# Patient Record
Sex: FEMALE | Race: BLACK OR AFRICAN AMERICAN | Employment: PART TIME | URBAN - METROPOLITAN AREA
[De-identification: names, ages, dates, MRNs, and addresses within clinical notes are randomized per-mention and may not be internally consistent; named-entity substitution may affect disease eponyms.]

---

## 2019-09-12 ENCOUNTER — OFFICE VISIT (OUTPATIENT)
Dept: FAMILY MEDICINE CLINIC | Age: 29
End: 2019-09-12

## 2019-09-12 VITALS
TEMPERATURE: 96.5 F | WEIGHT: 170 LBS | BODY MASS INDEX: 31.28 KG/M2 | HEART RATE: 75 BPM | OXYGEN SATURATION: 98 % | RESPIRATION RATE: 18 BRPM | SYSTOLIC BLOOD PRESSURE: 146 MMHG | DIASTOLIC BLOOD PRESSURE: 107 MMHG | HEIGHT: 62 IN

## 2019-09-12 DIAGNOSIS — J30.9 ALLERGIC RHINITIS, UNSPECIFIED SEASONALITY, UNSPECIFIED TRIGGER: Primary | ICD-10-CM

## 2019-09-12 DIAGNOSIS — F60.3 BORDERLINE PERSONALITY DISORDER (HCC): ICD-10-CM

## 2019-09-12 DIAGNOSIS — Z76.89 ENCOUNTER TO ESTABLISH CARE: ICD-10-CM

## 2019-09-12 DIAGNOSIS — Z79.899 HIGH RISK MEDICATION USE: ICD-10-CM

## 2019-09-12 DIAGNOSIS — F41.9 ANXIETY: ICD-10-CM

## 2019-09-12 DIAGNOSIS — H61.23 BILATERAL IMPACTED CERUMEN: ICD-10-CM

## 2019-09-12 DIAGNOSIS — Z13.220 LIPID SCREENING: ICD-10-CM

## 2019-09-12 DIAGNOSIS — Z13.29 SCREENING FOR THYROID DISORDER: ICD-10-CM

## 2019-09-12 RX ORDER — TRAZODONE HYDROCHLORIDE 100 MG/1
100 TABLET ORAL
COMMUNITY

## 2019-09-12 RX ORDER — MOMETASONE FUROATE 50 UG/1
2 SPRAY, METERED NASAL DAILY
Qty: 1 CONTAINER | Refills: 2 | Status: SHIPPED | OUTPATIENT
Start: 2019-09-12 | End: 2019-10-24 | Stop reason: SDUPTHER

## 2019-09-12 RX ORDER — BUSPIRONE HYDROCHLORIDE 10 MG/1
20 TABLET ORAL 2 TIMES DAILY
COMMUNITY

## 2019-09-12 RX ORDER — CETIRIZINE HCL 10 MG
10 TABLET ORAL DAILY
Qty: 90 TAB | Refills: 0 | Status: SHIPPED | OUTPATIENT
Start: 2019-09-12 | End: 2019-10-24 | Stop reason: SDUPTHER

## 2019-09-12 RX ORDER — OLANZAPINE 5 MG/1
TABLET ORAL
COMMUNITY

## 2019-09-12 NOTE — PROGRESS NOTES
ESTABLISH CARE VISIT    SUBJECTIVE:     Chief Complaint   Patient presents with    Establish Care    Nasal Congestion     Pt states that she has problems breathing out of her nose       HPI: 30 y.o.  female  has a past medical history of ADHD, Anxiety, and Borderline personality disorder ([facility] [address] is here for the above chief complaint(s). Borderline Personality Disorder/ADHD/Anxiety  Patient states that she was recently in rehab for substance abuse and has a follow-up with [facility] at the end of September. Patient is taking  Key Psychotherapeutic Meds             traZODone (DESYREL) 100 mg tablet (Taking) Take 100 mg by mouth nightly. 1/2 to 1 tab at bedtime. busPIRone (BUSPAR) 10 mg tablet (Taking) Take 20 mg by mouth two (2) times a day. OLANZapine (ZYPREXA) 5 mg tablet (Taking) Take  by mouth nightly. Patient states that she is taking her medications regularly. She doesn't feel the anxiety is controlled. She was feeling irritable yesterday. Allergic rhinitis  Patient states that she has ongoing allergies. She reports chronic sinus congestion and difficulty with breathing through her nose as well as runny nose. Patient states that she takes flonase, but she is using this more frequently than once a day. She was also taking claritin and did find this to be helpful, but she ran out and was not able to afford this OTC. She denies any fevers/chills, sore throat, sinus pressure, headaches, ear pain. Review of Systems   Constitutional: Negative for chills, fever, malaise/fatigue and weight loss. Eyes: Negative for blurred vision, double vision and pain. Respiratory: Negative for cough, sputum production, shortness of breath and wheezing. Cardiovascular: Negative for chest pain, palpitations, orthopnea, claudication and leg swelling. Gastrointestinal: Negative for abdominal pain, constipation, diarrhea, nausea and vomiting.    Genitourinary: Negative for dysuria, frequency and urgency. Neurological: Negative for dizziness, tingling and headaches. [unfilled]  Current Outpatient Medications   Medication Sig    traZODone (DESYREL) 100 mg tablet Take 100 mg by mouth nightly. 1/2 to 1 tab at bedtime.  busPIRone (BUSPAR) 10 mg tablet Take 20 mg by mouth two (2) times a day.  OLANZapine (ZYPREXA) 5 mg tablet Take  by mouth nightly.  fluticasone (FLONASE) 50 mcg/actuation nasal spray 2 Sprays by Both Nostrils route daily.  triamcinolone acetonide (KENALOG) 0.5 % ointment Apply  to affected area two (2) times a day. use thin layer    olopatadine (PATANOL) 0.1 % ophthalmic solution Administer 2 Drops to both eyes two (2) times a day. No current facility-administered medications for this visit. Health Maintenance   Topic Date Due    DTaP/Tdap/Td series (1 - Tdap) 02/05/2009    PAP AKA CERVICAL CYTOLOGY  02/05/2009    Influenza Age 5 to Adult  08/01/2019    Pneumococcal 0-64 years  Aged Out       Medications and Allergies: Reviewed and confirmed in the chart    Past Medical Hx: Reviewed and confirmed in the chart  Past Medical History:   Diagnosis Date    ADHD     Anxiety     Borderline personality disorder ([facility] [address]        There is no problem list on file for this patient. Family Hx, Surgical Hx, Social Hx: Reviewed and updated in EMR    OBJECTIVE:  Vitals:    09/12/19 1035   BP: (!) 146/107   Pulse: 75   Resp: 18   Temp: 96.5 °F (35.8 °C)   TempSrc: Oral   SpO2: 98%   Weight: 170 lb (77.1 kg)   Height: 5' 2\" (1.575 m)       BP Readings from Last 3 Encounters:   09/12/19 (!) 146/107   04/09/13 132/75   07/06/12 116/70     Wt Readings from Last 3 Encounters:   09/12/19 170 lb (77.1 kg)   04/09/13 168 lb 9.6 oz (76.5 kg)   07/06/12 157 lb 9.6 oz (71.5 kg)       Physical Exam   Constitutional: She is oriented to person, place, and time and well-developed, well-nourished, and in no distress. No distress.    HENT:   Right Ear: Hearing and external ear normal. No lacerations. A foreign body is present. No mastoid tenderness. No hemotympanum. Left Ear: External ear normal. No lacerations. A foreign body is present. No mastoid tenderness. No hemotympanum. No decreased hearing is noted. Nose: Mucosal edema and rhinorrhea present. No nose lacerations, sinus tenderness, nasal deformity, septal deviation or nasal septal hematoma. No epistaxis. No foreign bodies. Mouth/Throat: Uvula is midline and oropharynx is clear and moist.   Bilateral cerumen impaction  ++boggy turbinates, +edema   Neck: Normal range of motion. Neck supple. No thyromegaly present. Cardiovascular: Normal rate, regular rhythm, normal heart sounds and intact distal pulses. Exam reveals no gallop and no friction rub. No murmur heard. Pulmonary/Chest: Effort normal and breath sounds normal. No respiratory distress. She has no wheezes. She has no rales. She exhibits no tenderness. Lymphadenopathy:     She has no cervical adenopathy. Neurological: She is alert and oriented to person, place, and time. Skin: Skin is warm and dry. She is not diaphoretic. Psychiatric: Mood, memory, affect and judgment normal.   Vitals reviewed. Nursing Notes Reviewed    ASSESSMENT AND PLAN  Diagnoses and all orders for this visit:    1. Allergic rhinitis, unspecified seasonality, unspecified trigger  -     mometasone (NASONEX) 50 mcg/actuation nasal spray; 2 Sprays by Both Nostrils route daily. -     cetirizine (ZYRTEC) 10 mg tablet; Take 1 Tab by mouth daily. 2. High risk medication use  -     AMB POC EKG ROUTINE W/ 12 LEADS, INTER & REP    3. Bilateral impacted cerumen  -     carbamide peroxide (DEBROX) 6.5 % otic solution; Administer 5 Drops into each ear two (2) times a day. 4. Borderline personality disorder ([facility] [address]  Continue with   Key Psychotherapeutic Meds             traZODone (DESYREL) 100 mg tablet (Taking) Take 100 mg by mouth nightly. 1/2 to 1 tab at bedtime.     busPIRone (BUSPAR) 10 mg tablet (Taking) Take 20 mg by mouth two (2) times a day. OLANZapine (ZYPREXA) 5 mg tablet (Taking) Take  by mouth nightly. As prescribed. Can switch zyprexa to the am on a day she isn't working to see if she notices improvement in her mood and to see how sleep it will make her. Follow-up with psychiatry as schedule at the end of the month. 5. Anxiety  As above      Orders Placed This Encounter    traZODone (DESYREL) 100 mg tablet    busPIRone (BUSPAR) 10 mg tablet    OLANZapine (ZYPREXA) 5 mg tablet         I have discussed the diagnosis with the patient and the intended plan as seen in the above orders. The patient has received an after-visit summary and questions were answered concerning future plans. I have discussed medication side effects and warnings with the patient as well. I have reviewed the plan of care with the patient, accepted their input and they are in agreement with the treatment goals. More than 50% of this 30 min visit was spent counseling the patient face to face about etiology and treatment of health conditions outlined in assessment and plan        Cheyenne Chase [address], [address] [place], [place] [address] [phone]  [phone]

## 2019-09-12 NOTE — PROGRESS NOTES
Chief Complaint   Patient presents with   [address] [place] [address] [facility]    Nasal Congestion     Pt states that she has problems breathing out of her nose     1. Have you been to the ER, urgent care clinic since your last visit? Hospitalized since your last visit? No    2. Have you seen or consulted any other health care providers outside of the [address] [place] [address] since your last visit? Include any pap smears or colon screening.  [facility] [place]

## 2019-09-13 DIAGNOSIS — Z13.220 LIPID SCREENING: ICD-10-CM

## 2019-09-13 DIAGNOSIS — Z76.89 ENCOUNTER TO ESTABLISH CARE: ICD-10-CM

## 2019-09-13 DIAGNOSIS — Z13.29 SCREENING FOR THYROID DISORDER: ICD-10-CM

## 2019-10-24 ENCOUNTER — OFFICE VISIT (OUTPATIENT)
Dept: FAMILY MEDICINE CLINIC | Age: 29
End: 2019-10-24

## 2019-10-24 VITALS
DIASTOLIC BLOOD PRESSURE: 78 MMHG | TEMPERATURE: 96.8 F | SYSTOLIC BLOOD PRESSURE: 120 MMHG | HEIGHT: 62 IN | WEIGHT: 169 LBS | RESPIRATION RATE: 18 BRPM | OXYGEN SATURATION: 97 % | BODY MASS INDEX: 31.1 KG/M2 | HEART RATE: 82 BPM

## 2019-10-24 DIAGNOSIS — R82.90 CLOUDY URINE: ICD-10-CM

## 2019-10-24 DIAGNOSIS — Z01.419 WELL WOMAN EXAM: Primary | ICD-10-CM

## 2019-10-24 DIAGNOSIS — J30.9 ALLERGIC RHINITIS, UNSPECIFIED SEASONALITY, UNSPECIFIED TRIGGER: ICD-10-CM

## 2019-10-24 RX ORDER — CETIRIZINE HCL 10 MG
10 TABLET ORAL DAILY
Qty: 90 TAB | Refills: 0 | Status: SHIPPED | OUTPATIENT
Start: 2019-10-24

## 2019-10-24 RX ORDER — MOMETASONE FUROATE 50 UG/1
2 SPRAY, METERED NASAL DAILY
Qty: 1 CONTAINER | Refills: 2 | Status: SHIPPED | OUTPATIENT
Start: 2019-10-24

## 2019-10-24 NOTE — PROGRESS NOTES
SUBJECTIVE:   30 y.o. female for annual routine checkup. HPI:    Problem list updated as part of today's visit. Past medical, surgical, family history reviewed. Urine cloudy  Patient states that for the last week she is noticed that her urine has been slightly cloudy. She denies any other urinary symptoms. She denies any urinary frequency, dysuria, hematuria, fever/chills, back pain, flank pain. Patient denies any vaginal discharge, vaginal odor, abnormal uterine bleeding or rash. Right breast pain  Patient states that over the last few months she has been noticing pain in her right breast.  She states that the pain is intermittent and aching in nature. She denies any breast masses, lumps, nipple discharge. Patient does not know if these this pain is associated with her menstrual cycle. \    Social History     Tobacco Use    Smoking status: Current Some Day Smoker    Smokeless tobacco: Never Used    Tobacco comment: cigar   Substance Use Topics    Alcohol use: No    Drug use: Not Currently     Types: Marijuana, Cocaine       Current Outpatient Medications   Medication Sig Dispense Refill    cetirizine (ZYRTEC) 10 mg tablet Take 1 Tab by mouth daily. 90 Tab 0    mometasone (NASONEX) 50 mcg/actuation nasal spray 2 Sprays by Both Nostrils route daily. 1 Container 2    traZODone (DESYREL) 100 mg tablet Take 100 mg by mouth nightly. 1/2 to 1 tab at bedtime.  busPIRone (BUSPAR) 10 mg tablet Take 20 mg by mouth two (2) times a day.  OLANZapine (ZYPREXA) 5 mg tablet Take  by mouth nightly.  carbamide peroxide (DEBROX) 6.5 % otic solution Administer 5 Drops into each ear two (2) times a day. 7.5 mL 0    triamcinolone acetonide (KENALOG) 0.5 % ointment Apply  to affected area two (2) times a day. use thin layer 30 g 5    olopatadine (PATANOL) 0.1 % ophthalmic solution Administer 2 Drops to both eyes two (2) times a day. 5 mL 3     Allergies: Patient has no known allergies.    Patient's last menstrual period was 10/08/2019 (approximate). ROS:  Feeling well. No dyspnea or chest pain on exertion. No abdominal pain, change in bowel habits, black or bloody stools. No urinary tract symptoms. GYN ROS: normal menses, no abnormal bleeding, pelvic pain or discharge, no breast pain or new or enlarging lumps on self exam. No neurological complaints. OBJECTIVE:   Visit Vitals  /78   Pulse 82   Temp 96.8 °F (36 °C) (Oral)   Resp 18   Ht 5' 2\" (1.575 m)   Wt 169 lb (76.7 kg)   LMP 10/08/2019 (Approximate)   SpO2 97%   BMI 30.91 kg/m²    Body mass index is 30.91 kg/m². Gen: The patient appears well, alert, oriented, in no distress. Pulm: Lungs are clear, good air entry, no wheezes, rhonchi or rales. CV: S1 and S2 normal, no murmurs, regular rate and rhythm. Extremities show no edema, normal peripheral pulses. Pelvic exam: VULVA: normal appearing vulva with no masses, tenderness or lesions, VAGINA: normal appearing vagina with normal color and discharge, no lesions, CERVIX: normal appearing cervix without discharge or lesions, cervical discharge present - white and thin, UTERUS: uterus is normal size, shape, consistency and nontender, ADNEXA: normal adnexa in size, nontender and no masses, RECTAL: normal rectal, no masses. BREAST EXAM: breasts appear normal, no suspicious masses, no skin or nipple changes or axillary nodes    ASSESSMENT/PLAN  Diagnoses and all orders for this visit:    1. Well woman exam  -     PAP IG, APTIMA HPV AND RFX 16/18,45 ([id number]); Future  -     NUSWAB VAGINITIS PLUS; Future    2. Allergic rhinitis, unspecified seasonality, unspecified trigger  -     cetirizine (ZYRTEC) 10 mg tablet; Take 1 Tab by mouth daily. -     mometasone (NASONEX) 50 mcg/actuation nasal spray; 2 Sprays by Both Nostrils route daily. 3. Cloudy urine  -     URINALYSIS W/ RFLX MICROSCOPIC;  Future    return annually or prn     Orders Placed This Encounter   [address] PLUS     Standing Status:   Future     Standing Expiration Date:   10/24/2020    URINALYSIS W/ RFLX MICROSCOPIC     Standing Status:   Future     Standing Expiration Date:   10/24/2020    cetirizine (ZYRTEC) 10 mg tablet     Sig: Take 1 Tab by mouth daily. Dispense:  90 Tab     Refill:  0    mometasone (NASONEX) 50 mcg/actuation nasal spray     Si Sprays by Both Nostrils route daily. Dispense:  1 Container     Refill:  2    PAP IG, APTIMA HPV AND RFX [id number] ([id number])     Standing Status:   Future     Standing Expiration Date:   10/24/2020     Order Specific Question:   Pap Source? Answer:   Cervical     Order Specific Question:   Total Hysterectomy? Answer:   No     Order Specific Question:   Supracervical Hysterectomy? Answer:   No     Order Specific Question:   Post Menopausal?     Answer:   No     Order Specific Question:   Hormone Therapy? Answer:   No     Order Specific Question:   IUD? Answer:   No     Order Specific Question:   Abnormal Bleeding? Answer:   No     Order Specific Question:   Pregnant     Answer:   No     Order Specific Question:   Post Partum? Answer:    No

## 2019-10-24 NOTE — PROGRESS NOTES
Chief Complaint   Patient presents with    Well Woman     1. Have you been to the ER, urgent care clinic since your last visit? Hospitalized since your last visit? No    2. Have you seen or consulted any other health care providers outside of the [address] [place] [address] since your last visit? Include any pap smears or colon screening.  No

## 2019-10-27 LAB
A VAGINAE DNA VAG QL NAA+PROBE: ABNORMAL SCORE
BVAB2 DNA VAG QL NAA+PROBE: ABNORMAL SCORE
C ALBICANS DNA VAG QL NAA+PROBE: NEGATIVE
C GLABRATA DNA VAG QL NAA+PROBE: NEGATIVE
C TRACH RRNA SPEC QL NAA+PROBE: NEGATIVE
MEGA1 DNA VAG QL NAA+PROBE: ABNORMAL SCORE
N GONORRHOEA RRNA SPEC QL NAA+PROBE: NEGATIVE
T VAGINALIS RRNA SPEC QL NAA+PROBE: POSITIVE

## 2019-10-29 ENCOUNTER — TELEPHONE (OUTPATIENT)
Dept: FAMILY MEDICINE CLINIC | Age: 29
End: 2019-10-29

## 2019-10-29 NOTE — TELEPHONE ENCOUNTER
Please let patient know that her results of her pelvic exam showed yeast and it was indeterminate for bacterial vaginosis. I am going to treat her for both. I will call in flagyl for BV and diflucan for yeast. I would advise her to take the flagyl medication first and then take the diflucan once she has completed flagyl. If she is amenable, I will call these 2 products in. Thank you. Cheyenne Orozco PA-C  [facility]. [phone]  [address]

## 2019-10-29 NOTE — TELEPHONE ENCOUNTER
Tried to contact patient at the number provided in chart, someone picked up the phone but would not say anything. Unable to leave a message. Will try back.

## 2019-10-31 ENCOUNTER — TELEPHONE (OUTPATIENT)
Dept: FAMILY MEDICINE CLINIC | Age: 29
End: 2019-10-31

## 2019-10-31 DIAGNOSIS — B37.31 VAGINAL YEAST INFECTION: ICD-10-CM

## 2019-10-31 DIAGNOSIS — B96.89 BACTERIAL VAGINOSIS: Primary | ICD-10-CM

## 2019-10-31 DIAGNOSIS — N76.0 BACTERIAL VAGINOSIS: Primary | ICD-10-CM

## 2019-10-31 NOTE — TELEPHONE ENCOUNTER
Pt returned call from provider. She is unable to answer her phone while working & will try to call back before 2:30 today.

## 2019-10-31 NOTE — TELEPHONE ENCOUNTER
Pt called back to speak with provider. Provider was not available and pt was told, she then requested her lab results be left on her voice mail today.

## 2019-11-01 LAB
CYTOLOGIST CVX/VAG CYTO: ABNORMAL
CYTOLOGY CVX/VAG DOC CYTO: ABNORMAL
CYTOLOGY CVX/VAG DOC THIN PREP: ABNORMAL
HPV GENOTYPE 18,45: NEGATIVE
HPV I/H RISK 4 DNA CVX QL PROBE+SIG AMP: POSITIVE
HPV16 DNA CVX QL PROBE+SIG AMP: NEGATIVE
Lab: ABNORMAL
OTHER STN SPEC: ABNORMAL
STAT OF ADQ CVX/VAG CYTO-IMP: ABNORMAL

## 2019-11-01 RX ORDER — FLUCONAZOLE 150 MG/1
TABLET ORAL
Qty: 3 TAB | Refills: 0 | Status: SHIPPED | OUTPATIENT
Start: 2019-11-01

## 2019-11-01 RX ORDER — METRONIDAZOLE 500 MG/1
500 TABLET ORAL 2 TIMES DAILY
Qty: 14 TAB | Refills: 0 | Status: SHIPPED | OUTPATIENT
Start: 2019-11-01 | End: 2019-11-08

## 2019-11-01 NOTE — TELEPHONE ENCOUNTER
Please call patient back with her results of her pelvic exam as described below:     Please let patient know that her results of her pelvic exam showed yeast and it was indeterminate for bacterial vaginosis. I am going to treat her for both. I will call in flagyl for BV and diflucan for yeast. I would advise her to take the flagyl medication first and then take the diflucan once she has completed flagyl. If she is amenable, I will call these 2 products in.  Thank you.

## 2019-11-01 NOTE — TELEPHONE ENCOUNTER
Patient was informed of her nuswab results but was wondering about her pap results. Patient states that we can leave the pap results on her voice mail. Patient would like the flagyl and diflucan sent to her pharmacy. Routed to provider for review.

## 2019-11-02 NOTE — TELEPHONE ENCOUNTER
meds sent to pharmacy. Will call patient with PAP results on Monday when scheduled to work again. Cheyenne Orozco PA-C  [facility]  [phone]  [address]